# Patient Record
Sex: FEMALE | Race: WHITE | Employment: FULL TIME | URBAN - METROPOLITAN AREA
[De-identification: names, ages, dates, MRNs, and addresses within clinical notes are randomized per-mention and may not be internally consistent; named-entity substitution may affect disease eponyms.]

---

## 2021-07-06 ENCOUNTER — OFFICE VISIT (OUTPATIENT)
Dept: ENT CLINIC | Age: 36
End: 2021-07-06
Payer: COMMERCIAL

## 2021-07-06 VITALS
HEIGHT: 72 IN | SYSTOLIC BLOOD PRESSURE: 112 MMHG | BODY MASS INDEX: 16.66 KG/M2 | TEMPERATURE: 97.5 F | RESPIRATION RATE: 16 BRPM | HEART RATE: 72 BPM | OXYGEN SATURATION: 98 % | WEIGHT: 123 LBS | DIASTOLIC BLOOD PRESSURE: 68 MMHG

## 2021-07-06 DIAGNOSIS — R51.9 SINUS HEADACHE: Primary | ICD-10-CM

## 2021-07-06 DIAGNOSIS — R09.81 NASAL CONGESTION: ICD-10-CM

## 2021-07-06 PROCEDURE — 99203 OFFICE O/P NEW LOW 30 MIN: CPT | Performed by: OTOLARYNGOLOGY

## 2021-07-06 PROCEDURE — 31231 NASAL ENDOSCOPY DX: CPT | Performed by: OTOLARYNGOLOGY

## 2021-07-06 RX ORDER — ETONOGESTREL AND ETHINYL ESTRADIOL 11.7; 2.7 MG/1; MG/1
INSERT, EXTENDED RELEASE VAGINAL
COMMUNITY
Start: 2021-05-30

## 2021-07-06 RX ORDER — RIZATRIPTAN BENZOATE 10 MG/1
TABLET, ORALLY DISINTEGRATING ORAL
COMMUNITY
Start: 2021-06-19

## 2021-07-06 RX ORDER — FLUTICASONE PROPIONATE 50 MCG
2 SPRAY, SUSPENSION (ML) NASAL DAILY
Qty: 1 BOTTLE | Refills: 1 | Status: SHIPPED | OUTPATIENT
Start: 2021-07-06

## 2021-07-06 NOTE — PROGRESS NOTES
1. Have you been to the ER, urgent care clinic since your last visit? Hospitalized since your last visit? No    2. Have you seen or consulted any other health care providers outside of the [address] [place] [address] since your last visit? Include any pap smears or colon screening.  Neurologist at [facility] on [address]    Chief Complaint   Patient presents with    Initial Prenatal Visit     c/o painful headaches that are getting more frequent and feels pressure in face when headaches occur     Visit Vitals  /68 (BP 1 Location: Left upper arm, BP Patient Position: Sitting, BP Cuff Size: Adult)   Pulse 72   Temp 97.5 °F (36.4 °C) (Temporal)   Resp 16   Ht 6' 1\" (1.854 m)   Wt 123 lb (55.8 kg)   SpO2 98%   BMI 16.23 kg/m²

## 2021-07-06 NOTE — PROGRESS NOTES
Otolaryngology-Head and Neck Surgery  New Patient Visit     Patient: Darius Jeong  YOB: 1985  MRN: [id number]  Date of Service: 7/6/21    Chief Complaint: Headaches     History of Present Illness: Darius Jeong is a 39y.o. year old female who presents today for discussion of headaches. She notes chronic headaches ongoing for years. Worse in the last several years. Seen in this office a few years ago - thought headaches were likely related to migraine. Has been following with [facility] [address] [place] [address] with migraine management. Recently medications not as effective. Using Maxalt PRN for breakthrough which does seem to help    Headaches can vary location, but have started to involve her face and sinuses   Lack of migraine Rx makes her wonder if sinuses are driving her headaches    Headaches occur several times / month  Last for at least 1 day  Often triggered by menstrual cycle but can be random    Some allergies - mild  Has used PO antihistamines PRN     No taste/smell disturbance  No rhinitis, PND    Has had issues with sinus infections in the past requiring antibiotics  None recently    No sinus imaging  Had head CT related to trauma a few years ago at [facility]     Past Medical History:  History reviewed. No pertinent past medical history. Migraines    Past Surgical History:   History reviewed. No pertinent surgical history. Medications:   Current Outpatient Medications   Medication Instructions    ethinyl estradiol-etonogestrel (NUVARING) 0.12-0.015 mg/24 hr vaginal ring No dose, route, or frequency recorded.  fluticasone propionate (FLONASE) 50 mcg/actuation nasal spray 2 Sprays, Both Nostrils, DAILY    rizatriptan (MAXALT-MLT) 10 mg disintegrating tablet No dose, route, or frequency recorded.        Allergies:   No Known Allergies    Social History:   Social History     Tobacco Use    Smoking status: Never Smoker    Smokeless tobacco: Never Used   Vaping Use    Vaping Use: Never used   Substance Use Topics    Alcohol use: Not Currently    Drug use: Never        Family History:  Family History   Problem Relation Age of Onset    Hypertension Mother        Review of Systems:    Consitutional: denies fever, excessive weight gain or loss. Eyes: denies diplopia, eye pain. Integumentary: denies new concerning skin lesions. Ears, Nose, Mouth, Throat: denies except as per HPI. Endocrine: denies hot or cold intolerance, increased thirst.  Respiratory: denies cough, hemoptysis, wheezing  Gastrointestinal: denies trouble swallowing, nausea, emesis, regurgitation  Musculoskeletal: denies muscle weakness or wasting  Cardiovascular: denies chest pain, shortness of breath  Neurologic: denies seizures, numbness or tingling, syncope  Hematologic: denies easy bleeding or bruising    Physical Examination:   Vitals:    07/06/21 1556   BP: 112/68   BP 1 Location: Left upper arm   BP Patient Position: Sitting   BP Cuff Size: Adult   Pulse: 72   Temp: 97.5 °F (36.4 °C)   TempSrc: Temporal   Resp: 16   Height: 6' 1\" (1.854 m)   Weight: 123 lb (55.8 kg)   SpO2: 98%        General: Comfortable, pleasant, appears stated age  Voice: Strong, speaking in full sentences, no stridor    Face: No masses or lesions, facial strength symmetric   Ears: External ears unremarkable. Bilateral ear canal clear. Tympanic membrane clear and intact, with visible landmarks. Clear middle ear space  Nose: External nose unremarkable. Dorsum midline. Anterior rhinoscopy demonstrates no lesions. Septum mild left deviation. Turbinates without hypertrophy. Oral Cavity / Oropharynx: No trismus. Mucosa pink and moist. No lesions. Tongue is midline and mobile. Palate elevates symmetrically. Uvula midline. Tonsils unremarkable. Base of tongue soft. Floor of mouth soft. Neck: Supple. No adenopathy. Thyroid unremarkable. Palpable laryngeal landmarks. Full neck range of motion   Neurologic: CN II - XI intact.  Normal gait    PROCEDURE: NASAL ENDOSCOPY    Preoperative Diagnosis:  Sinus headaches     Postoperative Diagnosis: Same as above    Procedure: Nasal Endoscopy    Anesthesia: Topical 4% Lidocaine, Oxymetazoline    Description of Procedure: Verbal informed consent was obtained. After application of topical anesthetic and decongestant, the endoscope was introduced to the patient's nare. It was passed through the nose and into the nasopharynx. The scope was then withdrawn and repeated on the opposing nare. The patient tolerated the procedure well. Findings: Septum with mild left deviation. Bilateral inferior turbinates without hypertrophy. No polyposis or purulence. Middle meatus clear bilaterally. Nasopharynx clear without masses or lesions. Eustachian tube orifice unremarkable. Assessment and Plan:     1. Sinus headaches  2. Migraines  3. Sinus pressure  4. Nasal congestion  - Overall history does sound more consistent with migraine rather than CRS as etiology of headaches  - Nasal endoscopy with mild congestion but otherwise unremarkable  - Will try and obtain copy of prior CT head report to see if it had any signs of CRS on scan   - Discussed can consider dedicated sinus CT imaging given persistent symptoms ongoing for so many years  - Will start with evaluating old report  - Add flonase daily   - I will call once I Have a copy of her old CT and we can determine next steps - dedicated sinus imaging vs re-evaluation with her neurologist         The patient was instructed to return to clinic if no improvement or progression of symptoms. Signs to watch out for reviewed.       MD Bebo Jacobson [facility]  [address] [place] [address]  Office Phone: [phone]

## 2021-07-29 ENCOUNTER — TELEPHONE (OUTPATIENT)
Dept: ENT CLINIC | Age: 36
End: 2021-07-29

## 2021-07-29 DIAGNOSIS — J32.4 CHRONIC PANSINUSITIS: Primary | ICD-10-CM

## 2021-07-30 NOTE — TELEPHONE ENCOUNTER
Discussed with pt no head imaging at [facility] from records we received    Will therefore order sinus CT given persistent sinus / head complaints    MD Sukumar CarreraSharon Ville [address] [facility]  [address] [place] [address] [address]  [facility]  Office Phone: [phone]

## 2021-08-30 ENCOUNTER — HOSPITAL ENCOUNTER (OUTPATIENT)
Dept: CT IMAGING | Age: 36
Discharge: HOME | End: 2021-08-30
Attending: OTOLARYNGOLOGY
Payer: COMMERCIAL

## 2021-08-30 DIAGNOSIS — J32.4 CHRONIC PANSINUSITIS: ICD-10-CM

## 2021-08-30 PROCEDURE — 70486 CT MAXILLOFACIAL W/O DYE: CPT

## 2021-09-08 ENCOUNTER — TELEPHONE (OUTPATIENT)
Dept: ENT CLINIC | Age: 36
End: 2021-09-08

## 2021-09-08 NOTE — TELEPHONE ENCOUNTER
Called pt to discuss CT sinus   No answer, left VM for call back    MD Bebo Hernandez [facility]  [address] [place] [address] [address]  [facility]  Office Phone: [phone]

## 2021-09-09 ENCOUNTER — TELEPHONE (OUTPATIENT)
Dept: ENT CLINIC | Age: 36
End: 2021-09-09

## 2021-09-10 ENCOUNTER — TELEPHONE (OUTPATIENT)
Dept: ENT CLINIC | Age: 36
End: 2021-09-10